# Patient Record
Sex: FEMALE | Race: WHITE | URBAN - METROPOLITAN AREA
[De-identification: names, ages, dates, MRNs, and addresses within clinical notes are randomized per-mention and may not be internally consistent; named-entity substitution may affect disease eponyms.]

---

## 2022-07-29 ENCOUNTER — OFFICE VISIT (OUTPATIENT)
Dept: URBAN - METROPOLITAN AREA CLINIC 105 | Facility: CLINIC | Age: 52
End: 2022-07-29

## 2022-09-02 ENCOUNTER — OFFICE VISIT (OUTPATIENT)
Dept: URBAN - METROPOLITAN AREA CLINIC 105 | Facility: CLINIC | Age: 52
End: 2022-09-02

## 2022-12-01 ENCOUNTER — LAB OUTSIDE AN ENCOUNTER (OUTPATIENT)
Dept: URBAN - METROPOLITAN AREA CLINIC 80 | Facility: CLINIC | Age: 52
End: 2022-12-01

## 2022-12-01 ENCOUNTER — WEB ENCOUNTER (OUTPATIENT)
Dept: URBAN - METROPOLITAN AREA CLINIC 80 | Facility: CLINIC | Age: 52
End: 2022-12-01

## 2022-12-01 ENCOUNTER — OFFICE VISIT (OUTPATIENT)
Dept: URBAN - METROPOLITAN AREA CLINIC 80 | Facility: CLINIC | Age: 52
End: 2022-12-01
Payer: COMMERCIAL

## 2022-12-01 VITALS
HEART RATE: 71 BPM | HEIGHT: 67 IN | SYSTOLIC BLOOD PRESSURE: 109 MMHG | WEIGHT: 137.6 LBS | TEMPERATURE: 97.5 F | DIASTOLIC BLOOD PRESSURE: 80 MMHG | BODY MASS INDEX: 21.6 KG/M2

## 2022-12-01 DIAGNOSIS — R09.89 GLOBUS SENSATION: ICD-10-CM

## 2022-12-01 DIAGNOSIS — K64.4 EXTERNAL HEMORRHOIDS: ICD-10-CM

## 2022-12-01 DIAGNOSIS — R19.5 POSITIVE COLORECTAL CANCER SCREENING USING COLOGUARD TEST: ICD-10-CM

## 2022-12-01 DIAGNOSIS — Z12.11 COLON CANCER SCREENING: ICD-10-CM

## 2022-12-01 PROCEDURE — 99204 OFFICE O/P NEW MOD 45 MIN: CPT | Performed by: PHYSICIAN ASSISTANT

## 2022-12-01 RX ORDER — POLYETHYLENE GLYCOL 3350, SODIUM SULFATE, SODIUM CHLORIDE, POTASSIUM CHLORIDE, ASCORBIC ACID, SODIUM ASCORBATE 140-9-5.2G
AS DIRECTED KIT ORAL 1
Qty: 1 | Refills: 0 | OUTPATIENT
Start: 2022-12-01 | End: 2022-12-02

## 2022-12-01 RX ORDER — PANTOPRAZOLE SODIUM 40 MG/1
1 TABLET TABLET, DELAYED RELEASE ORAL ONCE A DAY
Qty: 30 | Refills: 1 | OUTPATIENT
Start: 2022-12-01

## 2022-12-01 RX ORDER — HYDROCORTISONE 25 MG/G
1 APPLICATION CREAM TOPICAL TWICE A DAY
Qty: 60 GM | Refills: 1 | OUTPATIENT
Start: 2022-12-01 | End: 2023-01-30

## 2022-12-01 NOTE — HPI-TODAY'S VISIT:
Pt had positive cologuard test in May Has never had a colonoscopy before no abd pain no nausea or emesis no fevers or chills normal bowel habit is 1 BM a day no BRBPR or melena Pts father had colon polyps  Pt feels a lump in her throat - started a few years ago - has had a few incidents where on her initial bite of food she felt like it gets stuck and spasms - will go down on its own  saw an ent -they did a scope and said they did not see much but she has a bad gag reflux so was tough also did barium swallow- no signs of reflux but barium tablet got stuck at [facility] and did not see it pass into her stomach not on any antacids she denies heartburn

## 2023-01-11 ENCOUNTER — TELEPHONE ENCOUNTER (OUTPATIENT)
Dept: URBAN - METROPOLITAN AREA CLINIC 92 | Facility: CLINIC | Age: 53
End: 2023-01-11

## 2023-01-11 ENCOUNTER — OFFICE VISIT (OUTPATIENT)
Dept: URBAN - METROPOLITAN AREA SURGERY CENTER 16 | Facility: SURGERY CENTER | Age: 53
End: 2023-01-11

## 2023-01-11 ENCOUNTER — CLAIMS CREATED FROM THE CLAIM WINDOW (OUTPATIENT)
Dept: URBAN - METROPOLITAN AREA SURGERY CENTER 16 | Facility: SURGERY CENTER | Age: 53
End: 2023-01-11
Payer: COMMERCIAL

## 2023-01-11 DIAGNOSIS — D12.1 ADENOMA OF APPENDIX: ICD-10-CM

## 2023-01-11 DIAGNOSIS — K62.1 ANAL AND RECTAL POLYP: ICD-10-CM

## 2023-01-11 DIAGNOSIS — K31.7 BENIGN GASTRIC POLYP: ICD-10-CM

## 2023-01-11 DIAGNOSIS — R19.5 ABNORMAL CONSISTENCY OF STOOL: ICD-10-CM

## 2023-01-11 DIAGNOSIS — R13.19 CERVICAL DYSPHAGIA: ICD-10-CM

## 2023-01-11 DIAGNOSIS — R93.3 ABN FINDINGS-GI TRACT: ICD-10-CM

## 2023-01-11 PROCEDURE — 45381 COLONOSCOPY SUBMUCOUS NJX: CPT | Performed by: INTERNAL MEDICINE

## 2023-01-11 PROCEDURE — 45380 COLONOSCOPY AND BIOPSY: CPT | Performed by: INTERNAL MEDICINE

## 2023-01-11 PROCEDURE — G8907 PT DOC NO EVENTS ON DISCHARG: HCPCS | Performed by: INTERNAL MEDICINE

## 2023-01-11 PROCEDURE — 43239 EGD BIOPSY SINGLE/MULTIPLE: CPT | Performed by: INTERNAL MEDICINE

## 2023-01-11 RX ORDER — HYDROCORTISONE 25 MG/G
1 APPLICATION CREAM TOPICAL TWICE A DAY
Qty: 60 GM | Refills: 1 | Status: ACTIVE | COMMUNITY
Start: 2022-12-01 | End: 2023-01-30

## 2023-01-11 RX ORDER — PANTOPRAZOLE SODIUM 40 MG/1
1 TABLET TABLET, DELAYED RELEASE ORAL ONCE A DAY
Qty: 30 | Refills: 1 | Status: ACTIVE | COMMUNITY
Start: 2022-12-01

## 2023-01-18 ENCOUNTER — TELEPHONE ENCOUNTER (OUTPATIENT)
Dept: URBAN - METROPOLITAN AREA CLINIC 92 | Facility: CLINIC | Age: 53
End: 2023-01-18

## 2023-01-25 ENCOUNTER — TELEPHONE ENCOUNTER (OUTPATIENT)
Dept: URBAN - METROPOLITAN AREA CLINIC 92 | Facility: CLINIC | Age: 53
End: 2023-01-25

## 2023-03-06 ENCOUNTER — ERX REFILL RESPONSE (OUTPATIENT)
Dept: URBAN - METROPOLITAN AREA CLINIC 80 | Facility: CLINIC | Age: 53
End: 2023-03-06

## 2023-03-06 RX ORDER — PANTOPRAZOLE SODIUM 40 MG/1
1 TABLET TABLET, DELAYED RELEASE ORAL ONCE A DAY
Qty: 30 | Refills: 1 | OUTPATIENT

## 2023-03-06 RX ORDER — PANTOPRAZOLE SODIUM 40 MG/1
TAKE ONE TABLET BY MOUTH ONE TIME DAILY TABLET, DELAYED RELEASE ORAL
Qty: 30 TABLET | Refills: 6 | OUTPATIENT

## 2023-03-10 ENCOUNTER — OFFICE VISIT (OUTPATIENT)
Dept: URBAN - METROPOLITAN AREA CLINIC 105 | Facility: CLINIC | Age: 53
End: 2023-03-10

## 2023-03-17 ENCOUNTER — OFFICE VISIT (OUTPATIENT)
Dept: URBAN - METROPOLITAN AREA TELEHEALTH 2 | Facility: TELEHEALTH | Age: 53
End: 2023-03-17

## 2023-03-17 RX ORDER — PANTOPRAZOLE SODIUM 40 MG/1
TAKE ONE TABLET BY MOUTH ONE TIME DAILY TABLET, DELAYED RELEASE ORAL
Qty: 30 TABLET | Refills: 6 | Status: ACTIVE | COMMUNITY

## 2023-04-21 ENCOUNTER — TELEPHONE ENCOUNTER (OUTPATIENT)
Dept: URBAN - METROPOLITAN AREA CLINIC 63 | Facility: CLINIC | Age: 53
End: 2023-04-21

## 2023-10-06 ENCOUNTER — OFFICE VISIT (OUTPATIENT)
Dept: URBAN - METROPOLITAN AREA CLINIC 105 | Facility: CLINIC | Age: 53
End: 2023-10-06
Payer: COMMERCIAL

## 2023-10-06 VITALS
HEART RATE: 85 BPM | WEIGHT: 134 LBS | SYSTOLIC BLOOD PRESSURE: 116 MMHG | BODY MASS INDEX: 21.03 KG/M2 | DIASTOLIC BLOOD PRESSURE: 78 MMHG | HEIGHT: 67 IN | TEMPERATURE: 97.3 F

## 2023-10-06 DIAGNOSIS — R09.89 GLOBUS SENSATION: ICD-10-CM

## 2023-10-06 DIAGNOSIS — R14.0 ABDOMINAL BLOATING: ICD-10-CM

## 2023-10-06 PROCEDURE — 99203 OFFICE O/P NEW LOW 30 MIN: CPT | Performed by: INTERNAL MEDICINE

## 2023-10-06 PROCEDURE — 99213 OFFICE O/P EST LOW 20 MIN: CPT | Performed by: INTERNAL MEDICINE

## 2023-10-06 RX ORDER — FAMOTIDINE 20 MG/1
1 TABLET AT BEDTIME TABLET, FILM COATED ORAL ONCE A DAY
Qty: 30 | Refills: 1 | OUTPATIENT
Start: 2023-10-06

## 2023-10-06 RX ORDER — PANTOPRAZOLE SODIUM 40 MG/1
TAKE ONE TABLET BY MOUTH ONE TIME DAILY TABLET, DELAYED RELEASE ORAL
Qty: 30 TABLET | Refills: 6 | Status: ACTIVE | COMMUNITY

## 2023-10-06 RX ORDER — PANTOPRAZOLE SODIUM 40 MG/1
TAKE ONE TABLET 30-60 MINUTES BEFORE FIRST MEAL OF THE DAY TABLET, DELAYED RELEASE ORAL ONCE A DAY
Qty: 30 | Refills: 3 | OUTPATIENT

## 2023-12-08 ENCOUNTER — OFFICE VISIT (OUTPATIENT)
Dept: URBAN - METROPOLITAN AREA CLINIC 105 | Facility: CLINIC | Age: 53
End: 2023-12-08

## 2023-12-11 ENCOUNTER — OFFICE VISIT (OUTPATIENT)
Dept: URBAN - METROPOLITAN AREA CLINIC 105 | Facility: CLINIC | Age: 53
End: 2023-12-11

## 2023-12-11 RX ORDER — FAMOTIDINE 20 MG/1
1 TABLET AT BEDTIME TABLET, FILM COATED ORAL ONCE A DAY
Qty: 30 | Refills: 1 | COMMUNITY
Start: 2023-10-06

## 2023-12-11 RX ORDER — PANTOPRAZOLE SODIUM 40 MG/1
TAKE ONE TABLET 30-60 MINUTES BEFORE FIRST MEAL OF THE DAY TABLET, DELAYED RELEASE ORAL ONCE A DAY
Qty: 30 | Refills: 3 | COMMUNITY

## 2024-01-19 ENCOUNTER — OFFICE VISIT (OUTPATIENT)
Dept: URBAN - METROPOLITAN AREA CLINIC 105 | Facility: CLINIC | Age: 54
End: 2024-01-19
Payer: COMMERCIAL

## 2024-01-19 ENCOUNTER — LAB OUTSIDE AN ENCOUNTER (OUTPATIENT)
Dept: URBAN - METROPOLITAN AREA CLINIC 105 | Facility: CLINIC | Age: 54
End: 2024-01-19

## 2024-01-19 VITALS
TEMPERATURE: 97.3 F | BODY MASS INDEX: 21.16 KG/M2 | HEART RATE: 73 BPM | HEIGHT: 67 IN | WEIGHT: 134.8 LBS | DIASTOLIC BLOOD PRESSURE: 82 MMHG | SYSTOLIC BLOOD PRESSURE: 117 MMHG

## 2024-01-19 DIAGNOSIS — K21.00 ALKALINE REFLUX ESOPHAGITIS: ICD-10-CM

## 2024-01-19 DIAGNOSIS — R14.0 ABDOMINAL BLOATING: ICD-10-CM

## 2024-01-19 DIAGNOSIS — R13.12 OROPHARYNGEAL DYSPHAGIA: ICD-10-CM

## 2024-01-19 DIAGNOSIS — R09.89 GLOBUS SENSATION: ICD-10-CM

## 2024-01-19 PROBLEM — 71457002: Status: ACTIVE | Noted: 2024-01-19

## 2024-01-19 PROBLEM — 266433003: Status: ACTIVE | Noted: 2024-01-19

## 2024-01-19 PROBLEM — 406506008: Status: ACTIVE | Noted: 2024-01-19

## 2024-01-19 PROBLEM — 428305005: Status: ACTIVE | Noted: 2024-01-19

## 2024-01-19 PROCEDURE — 99213 OFFICE O/P EST LOW 20 MIN: CPT | Performed by: INTERNAL MEDICINE

## 2024-01-19 RX ORDER — FAMOTIDINE 20 MG/1
1 TABLET AT BEDTIME TABLET, FILM COATED ORAL ONCE A DAY
Qty: 90 TABLET | Refills: 0 | OUTPATIENT
Start: 2023-10-06

## 2024-01-19 RX ORDER — PANTOPRAZOLE SODIUM 40 MG/1
TAKE ONE TABLET 30-60 MINUTES BEFORE FIRST MEAL OF THE DAY TABLET, DELAYED RELEASE ORAL ONCE A DAY
Qty: 30 | Refills: 3 | Status: ACTIVE | COMMUNITY

## 2024-01-19 RX ORDER — FAMOTIDINE 20 MG/1
1 TABLET AT BEDTIME TABLET, FILM COATED ORAL ONCE A DAY
Qty: 30 | Refills: 1 | Status: ACTIVE | COMMUNITY
Start: 2023-10-06

## 2024-01-19 RX ORDER — PANTOPRAZOLE SODIUM 40 MG/1
TAKE ONE TABLET 30-60 MINUTES BEFORE FIRST MEAL OF THE DAY TABLET, DELAYED RELEASE ORAL TWICE A DAY
Qty: 180 | Refills: 0 | OUTPATIENT

## 2024-01-19 NOTE — HPI-TODAY'S VISIT:
10/06/2023 Established patient here for evaluation of bloating and gas at night. Als0 complains of globus sensation in throat. Usually fine during the day.  Diet consistent with fruits, vegetables, crackers with cheese, chicken, fish, pasta, and bread.  Tomatoes are a known trigger. Doesn't drink soda. No issues with coffee or alcohol. Has been taking Pantoprazole 40 mg daily for the past 6 months. Reports no improvement. States she took a few days off and did not notice a difference. Last EGD: 01/2023 that showed reflux esophagitis that was non-bleeding, and gastric polyps. Last colon: 01/2023 that showed 30 mm polyp at the appendiceal orifice, tortuous colon, Non-bleeding, external and internal hemorrhoids. Path showed Fundic gland polyp. Tubulovillous adenoma of appendiceal orifice. She was referred to surgery for laparoscopic right hemicolectomy and appendectomy. Patient now has a transverse colonic anastomosis. She reports no post surgical complications.  01/19/2024 patient is here as a follow up. She states she is still having the globus sensation in her throat daily that will last for a week then it will go away for about a month. She states she will have trouble swallowing french fries and pills. She reports she has an esophageal stricture. Currently, taking Pantoprazole 40 mg in the AM and Famotidine 40 mg at bedtime. She is "trying" to adhere to the antireflux diet. She states she loves spicy foods, she will have some once a month. She will have some regurgitation.  Denies HB, n/v, epigastric pain, and melena.

## 2024-01-22 ENCOUNTER — LAB OUTSIDE AN ENCOUNTER (OUTPATIENT)
Dept: URBAN - METROPOLITAN AREA CLINIC 105 | Facility: CLINIC | Age: 54
End: 2024-01-22

## 2024-02-08 ENCOUNTER — LAB (OUTPATIENT)
Dept: URBAN - METROPOLITAN AREA CLINIC 105 | Facility: CLINIC | Age: 54
End: 2024-02-08

## 2024-02-27 ENCOUNTER — TELNP (OUTPATIENT)
Dept: URBAN - METROPOLITAN AREA TELEHEALTH 2 | Facility: TELEHEALTH | Age: 54
End: 2024-02-27
Payer: COMMERCIAL

## 2024-02-27 DIAGNOSIS — R14.0 ABDOMINAL BLOATING: ICD-10-CM

## 2024-02-27 PROCEDURE — 97802 MEDICAL NUTRITION INDIV IN: CPT | Performed by: DIETITIAN, REGISTERED

## 2024-02-27 RX ORDER — FAMOTIDINE 20 MG/1
1 TABLET AT BEDTIME TABLET, FILM COATED ORAL ONCE A DAY
Qty: 90 TABLET | Refills: 0 | Status: ACTIVE | COMMUNITY
Start: 2023-10-06

## 2024-02-27 RX ORDER — PANTOPRAZOLE SODIUM 40 MG/1
TAKE ONE TABLET 30-60 MINUTES BEFORE FIRST MEAL OF THE DAY TABLET, DELAYED RELEASE ORAL TWICE A DAY
Qty: 180 | Refills: 0 | Status: ACTIVE | COMMUNITY

## 2024-03-18 ENCOUNTER — TELEP (OUTPATIENT)
Dept: URBAN - METROPOLITAN AREA TELEHEALTH 2 | Facility: TELEHEALTH | Age: 54
End: 2024-03-18
Payer: COMMERCIAL

## 2024-03-18 DIAGNOSIS — K21.00 GASTROESOPHAGEAL REFLUX DISEASE WITH ESOPHAGITIS WITHOUT HEMORRHAGE: ICD-10-CM

## 2024-03-18 PROCEDURE — 97803 MED NUTRITION INDIV SUBSEQ: CPT | Performed by: DIETITIAN, REGISTERED

## 2024-03-18 RX ORDER — PANTOPRAZOLE SODIUM 40 MG/1
TAKE ONE TABLET 30-60 MINUTES BEFORE FIRST MEAL OF THE DAY TABLET, DELAYED RELEASE ORAL TWICE A DAY
Qty: 180 | Refills: 0 | Status: ACTIVE | COMMUNITY

## 2024-03-18 RX ORDER — FAMOTIDINE 20 MG/1
1 TABLET AT BEDTIME TABLET, FILM COATED ORAL ONCE A DAY
Qty: 90 TABLET | Refills: 0 | Status: ACTIVE | COMMUNITY
Start: 2023-10-06

## 2024-03-22 ENCOUNTER — OV EP (OUTPATIENT)
Dept: URBAN - METROPOLITAN AREA CLINIC 105 | Facility: CLINIC | Age: 54
End: 2024-03-22
Payer: COMMERCIAL

## 2024-03-22 VITALS
TEMPERATURE: 97.2 F | BODY MASS INDEX: 20.72 KG/M2 | HEIGHT: 67 IN | DIASTOLIC BLOOD PRESSURE: 77 MMHG | HEART RATE: 76 BPM | WEIGHT: 132 LBS | SYSTOLIC BLOOD PRESSURE: 125 MMHG

## 2024-03-22 DIAGNOSIS — K21.00 GASTROESOPHAGEAL REFLUX DISEASE WITH ESOPHAGITIS WITHOUT HEMORRHAGE: ICD-10-CM

## 2024-03-22 DIAGNOSIS — R09.89 GLOBUS SENSATION: ICD-10-CM

## 2024-03-22 DIAGNOSIS — F90.9 ATTENTION DEFICIT HYPERACTIVITY DISORDER (ADHD), UNSPECIFIED ADHD TYPE: ICD-10-CM

## 2024-03-22 DIAGNOSIS — R13.19 ESOPHAGEAL DYSPHAGIA: ICD-10-CM

## 2024-03-22 DIAGNOSIS — R14.0 ABDOMINAL BLOATING: ICD-10-CM

## 2024-03-22 DIAGNOSIS — Z90.49 S/P RIGHT HEMICOLECTOMY: ICD-10-CM

## 2024-03-22 PROCEDURE — 99213 OFFICE O/P EST LOW 20 MIN: CPT

## 2024-03-22 RX ORDER — OMEPRAZOLE 40 MG/1
1 CAPSULE 30 MINUTES BEFORE MEALS CAPSULE, DELAYED RELEASE ORAL TWICE A DAY
Qty: 180 | Refills: 0 | OUTPATIENT
Start: 2024-03-22

## 2024-03-22 RX ORDER — FAMOTIDINE 20 MG/1
1 TABLET AT BEDTIME TABLET, FILM COATED ORAL ONCE A DAY
Qty: 90 TABLET | Refills: 0 | Status: ACTIVE | COMMUNITY
Start: 2023-10-06

## 2024-03-22 RX ORDER — PANTOPRAZOLE SODIUM 40 MG/1
TAKE ONE TABLET 30-60 MINUTES BEFORE FIRST MEAL OF THE DAY TABLET, DELAYED RELEASE ORAL TWICE A DAY
Qty: 180 | Refills: 0 | Status: ACTIVE | COMMUNITY

## 2024-03-22 RX ORDER — SERDEXMETHYLPHENIDATE AND DEXMETHYLPHENIDATE 5.2; 26.1 MG/1; MG/1
TAKE ONE CAPSULE BY MOUTH EVERY MORNING CAPSULE ORAL
Qty: 30 UNSPECIFIED | Refills: 0 | Status: ACTIVE | COMMUNITY

## 2024-03-22 NOTE — HPI-TODAY'S VISIT:
10/06/2023 Established patient here for evaluation of bloating and gas at night. Als0 complains of globus sensation in throat. Usually fine during the day.  Diet consistent with fruits, vegetables, crackers with cheese, chicken, fish, pasta, and bread.  Tomatoes are a known trigger. Doesn't drink soda. No issues with coffee or alcohol. Has been taking Pantoprazole 40 mg daily for the past 6 months. Reports no improvement. States she took a few days off and did not notice a difference. Last EGD: 01/2023 that showed reflux esophagitis that was non-bleeding, and gastric polyps. Last colon: 01/2023 that showed 30 mm polyp at the appendiceal orifice, tortuous colon, Non-bleeding, external and internal hemorrhoids. Path showed Fundic gland polyp. Tubulovillous adenoma of appendiceal orifice. She was referred to surgery for laparoscopic right hemicolectomy and appendectomy. Patient now has a transverse colonic anastomosis. She reports no post surgical complications.  01/19/2024 patient is here as a follow up. She states she is still having the globus sensation in her throat daily that will last for a week then it will go away for about a month. She states she will have trouble swallowing french fries and pills. She reports she has an esophageal stricture. Currently, taking Pantoprazole 40 mg in the AM and Famotidine 40 mg at bedtime. She is "trying" to adhere to the antireflux diet. She states she loves spicy foods, she will have some once a month. She will have some regurgitation.  Denies HB, n/v, epigastric pain, and melena.  03/22/2024 pt here as a follow-up. She states she stopped her PPI and H2 blocker. She has not noticed any changes.  She states she had a discussion with the nutritionist about decreasing the size of her meals daily to help with her swallowing difficulties. She states she still has the globus sensation and occasional dysphagia. She states she was not interested in doing the barium swallow at the moment.  She states she is due for a colon this year according to [facility] Dr. Nic Ellsworth.

## 2024-04-02 ENCOUNTER — LAB (OUTPATIENT)
Dept: URBAN - METROPOLITAN AREA CLINIC 105 | Facility: CLINIC | Age: 54
End: 2024-04-02

## 2024-06-25 ENCOUNTER — DASHBOARD ENCOUNTERS (OUTPATIENT)
Age: 54
End: 2024-06-25

## 2024-06-28 ENCOUNTER — OFFICE VISIT (OUTPATIENT)
Dept: URBAN - METROPOLITAN AREA CLINIC 105 | Facility: CLINIC | Age: 54
End: 2024-06-28

## 2024-06-28 RX ORDER — FAMOTIDINE 20 MG/1
1 TABLET AT BEDTIME TABLET, FILM COATED ORAL ONCE A DAY
Qty: 90 TABLET | Refills: 0 | Status: ACTIVE | COMMUNITY
Start: 2023-10-06

## 2024-06-28 RX ORDER — PANTOPRAZOLE SODIUM 40 MG/1
TAKE ONE TABLET 30-60 MINUTES BEFORE FIRST MEAL OF THE DAY TABLET, DELAYED RELEASE ORAL TWICE A DAY
Qty: 180 | Refills: 0 | Status: ACTIVE | COMMUNITY

## 2024-06-28 RX ORDER — SERDEXMETHYLPHENIDATE AND DEXMETHYLPHENIDATE 5.2; 26.1 MG/1; MG/1
TAKE ONE CAPSULE BY MOUTH EVERY MORNING CAPSULE ORAL
Qty: 30 UNSPECIFIED | Refills: 0 | Status: ACTIVE | COMMUNITY

## 2024-06-28 RX ORDER — OMEPRAZOLE 40 MG/1
1 CAPSULE 30 MINUTES BEFORE MEALS CAPSULE, DELAYED RELEASE ORAL TWICE A DAY
Qty: 180 | Refills: 0 | Status: ACTIVE | COMMUNITY
Start: 2024-03-22

## 2024-08-16 ENCOUNTER — LAB OUTSIDE AN ENCOUNTER (OUTPATIENT)
Dept: URBAN - METROPOLITAN AREA CLINIC 105 | Facility: CLINIC | Age: 54
End: 2024-08-16

## 2024-08-16 ENCOUNTER — OFFICE VISIT (OUTPATIENT)
Dept: URBAN - METROPOLITAN AREA CLINIC 105 | Facility: CLINIC | Age: 54
End: 2024-08-16
Payer: COMMERCIAL

## 2024-08-16 VITALS
WEIGHT: 124 LBS | HEART RATE: 67 BPM | BODY MASS INDEX: 19.46 KG/M2 | DIASTOLIC BLOOD PRESSURE: 74 MMHG | SYSTOLIC BLOOD PRESSURE: 107 MMHG | TEMPERATURE: 97.1 F | HEIGHT: 67 IN

## 2024-08-16 DIAGNOSIS — F90.9 ATTENTION DEFICIT HYPERACTIVITY DISORDER (ADHD), UNSPECIFIED ADHD TYPE: ICD-10-CM

## 2024-08-16 DIAGNOSIS — K21.00 GASTROESOPHAGEAL REFLUX DISEASE WITH ESOPHAGITIS WITHOUT HEMORRHAGE: ICD-10-CM

## 2024-08-16 DIAGNOSIS — R13.19 ESOPHAGEAL DYSPHAGIA: ICD-10-CM

## 2024-08-16 DIAGNOSIS — D36.9 TUBULOVILLOUS ADENOMA: ICD-10-CM

## 2024-08-16 DIAGNOSIS — R09.89 GLOBUS SENSATION: ICD-10-CM

## 2024-08-16 DIAGNOSIS — R14.0 ABDOMINAL BLOATING: ICD-10-CM

## 2024-08-16 DIAGNOSIS — Z90.49 S/P RIGHT HEMICOLECTOMY: ICD-10-CM

## 2024-08-16 DIAGNOSIS — R19.4 CHANGE IN BOWEL HABITS: ICD-10-CM

## 2024-08-16 PROCEDURE — 99214 OFFICE O/P EST MOD 30 MIN: CPT | Performed by: INTERNAL MEDICINE

## 2024-08-16 RX ORDER — SERDEXMETHYLPHENIDATE AND DEXMETHYLPHENIDATE 5.2; 26.1 MG/1; MG/1
TAKE ONE CAPSULE BY MOUTH EVERY MORNING CAPSULE ORAL
Qty: 30 UNSPECIFIED | Refills: 0 | Status: ON HOLD | COMMUNITY

## 2024-08-16 RX ORDER — OMEPRAZOLE 40 MG/1
1 CAPSULE 30 MINUTES BEFORE MEALS CAPSULE, DELAYED RELEASE ORAL TWICE A DAY
Qty: 180 | Refills: 0 | Status: ON HOLD | COMMUNITY
Start: 2024-03-22

## 2024-08-16 RX ORDER — FAMOTIDINE 20 MG/1
1 TABLET AT BEDTIME TABLET, FILM COATED ORAL ONCE A DAY
Qty: 90 TABLET | Refills: 0 | Status: ON HOLD | COMMUNITY
Start: 2023-10-06

## 2024-08-16 RX ORDER — PANTOPRAZOLE SODIUM 40 MG/1
TAKE ONE TABLET 30-60 MINUTES BEFORE FIRST MEAL OF THE DAY TABLET, DELAYED RELEASE ORAL TWICE A DAY
Qty: 180 | Refills: 0 | Status: ON HOLD | COMMUNITY

## 2024-08-16 NOTE — HPI-TODAY'S VISIT:
10/06/2023 Established patient here for evaluation of bloating and gas at night. Als0 complains of globus sensation in throat. Usually fine during the day.  Diet consistent with fruits, vegetables, crackers with cheese, chicken, fish, pasta, and bread.  Tomatoes are a known trigger. Doesn't drink soda. No issues with coffee or alcohol. Has been taking Pantoprazole 40 mg daily for the past 6 months. Reports no improvement. States she took a few days off and did not notice a difference. Last EGD: 01/2023 that showed reflux esophagitis that was non-bleeding, and gastric polyps. Last colon: 01/2023 that showed 30 mm polyp at the appendiceal orifice, tortuous colon, Non-bleeding, external and internal hemorrhoids. Path showed Fundic gland polyp. Tubulovillous adenoma of appendiceal orifice. She was referred to surgery for laparoscopic right hemicolectomy and appendectomy. Patient now has a transverse colonic anastomosis. She reports no post surgical complications.  01/19/2024 patient is here as a follow up. She states she is still having the globus sensation in her throat daily that will last for a week then it will go away for about a month. She states she will have trouble swallowing french fries and pills. She reports she has an esophageal stricture. Currently, taking Pantoprazole 40 mg in the AM and Famotidine 40 mg at bedtime. She is "trying" to adhere to the antireflux diet. She states she loves spicy foods, she will have some once a month. She will have some regurgitation.  Denies HB, n/v, epigastric pain, and melena.  03/22/2024 pt here as a follow-up. She states she stopped her PPI and H2 blocker. She has not noticed any changes.  She states she had a discussion with the nutritionist about decreasing the size of her meals daily to help with her swallowing difficulties. She states she still has the globus sensation and occasional dysphagia. She states she was not interested in doing the barium swallow at the moment.  She states she is due for a colon this year according to [facility] Dr. Nic Ellsworth.  8/16/24 Needs colon surveillance s/p right hemicolectomy for large R TVA.  She has regular BMs. says they are often loose . Went to [place] recently and had some issues on her return - thinks she had a gastroenteritis illness with diarrhea and nausea for a few weeks. Now back on track she thinks Wants to see a nutritionist - If I try to eat regular foods I get a lot of gas Asking about globus sensation - "it is better most of the time". now oepn to a barium swallow Not on any PPIs

## 2024-08-28 ENCOUNTER — OFFICE VISIT (OUTPATIENT)
Dept: URBAN - METROPOLITAN AREA TELEHEALTH 2 | Facility: TELEHEALTH | Age: 54
End: 2024-08-28
Payer: COMMERCIAL

## 2024-08-28 DIAGNOSIS — K58.8 OTHER IRRITABLE BOWEL SYNDROME: ICD-10-CM

## 2024-08-28 DIAGNOSIS — R63.4 ABNORMAL WEIGHT LOSS: ICD-10-CM

## 2024-08-28 PROCEDURE — 97803 MED NUTRITION INDIV SUBSEQ: CPT | Performed by: DIETITIAN, REGISTERED

## 2024-08-28 RX ORDER — FAMOTIDINE 20 MG/1
1 TABLET AT BEDTIME TABLET, FILM COATED ORAL ONCE A DAY
Qty: 90 TABLET | Refills: 0 | Status: ON HOLD | COMMUNITY
Start: 2023-10-06

## 2024-08-28 RX ORDER — PANTOPRAZOLE SODIUM 40 MG/1
TAKE ONE TABLET 30-60 MINUTES BEFORE FIRST MEAL OF THE DAY TABLET, DELAYED RELEASE ORAL TWICE A DAY
Qty: 180 | Refills: 0 | Status: ON HOLD | COMMUNITY

## 2024-08-28 RX ORDER — OMEPRAZOLE 40 MG/1
1 CAPSULE 30 MINUTES BEFORE MEALS CAPSULE, DELAYED RELEASE ORAL TWICE A DAY
Qty: 180 | Refills: 0 | Status: ON HOLD | COMMUNITY
Start: 2024-03-22

## 2024-08-28 RX ORDER — SERDEXMETHYLPHENIDATE AND DEXMETHYLPHENIDATE 5.2; 26.1 MG/1; MG/1
TAKE ONE CAPSULE BY MOUTH EVERY MORNING CAPSULE ORAL
Qty: 30 UNSPECIFIED | Refills: 0 | Status: ON HOLD | COMMUNITY

## 2024-10-15 ENCOUNTER — OFFICE VISIT (OUTPATIENT)
Dept: URBAN - METROPOLITAN AREA SURGERY CENTER 16 | Facility: SURGERY CENTER | Age: 54
End: 2024-10-15

## 2025-02-05 ENCOUNTER — OFFICE VISIT (OUTPATIENT)
Dept: URBAN - METROPOLITAN AREA SURGERY CENTER 16 | Facility: SURGERY CENTER | Age: 55
End: 2025-02-05

## 2025-02-25 ENCOUNTER — OFFICE VISIT (OUTPATIENT)
Dept: URBAN - METROPOLITAN AREA SURGERY CENTER 16 | Facility: SURGERY CENTER | Age: 55
End: 2025-02-25

## 2025-02-25 RX ORDER — FAMOTIDINE 20 MG/1
1 TABLET AT BEDTIME TABLET, FILM COATED ORAL ONCE A DAY
Qty: 90 TABLET | Refills: 0 | Status: ON HOLD | COMMUNITY
Start: 2023-10-06

## 2025-02-25 RX ORDER — PANTOPRAZOLE SODIUM 40 MG/1
TAKE ONE TABLET 30-60 MINUTES BEFORE FIRST MEAL OF THE DAY TABLET, DELAYED RELEASE ORAL TWICE A DAY
Qty: 180 | Refills: 0 | Status: ON HOLD | COMMUNITY

## 2025-02-25 RX ORDER — OMEPRAZOLE 40 MG/1
1 CAPSULE 30 MINUTES BEFORE MEALS CAPSULE, DELAYED RELEASE ORAL TWICE A DAY
Qty: 180 | Refills: 0 | Status: ON HOLD | COMMUNITY
Start: 2024-03-22

## 2025-02-25 RX ORDER — SERDEXMETHYLPHENIDATE AND DEXMETHYLPHENIDATE 5.2; 26.1 MG/1; MG/1
TAKE ONE CAPSULE BY MOUTH EVERY MORNING CAPSULE ORAL
Qty: 30 UNSPECIFIED | Refills: 0 | Status: ON HOLD | COMMUNITY